# Patient Record
Sex: MALE | Race: WHITE | Employment: STUDENT | URBAN - METROPOLITAN AREA
[De-identification: names, ages, dates, MRNs, and addresses within clinical notes are randomized per-mention and may not be internally consistent; named-entity substitution may affect disease eponyms.]

---

## 2020-08-03 ENCOUNTER — TELEPHONE (OUTPATIENT)
Dept: ENDOCRINOLOGY | Age: 50
End: 2020-08-03

## 2020-08-03 ENCOUNTER — OFFICE VISIT (OUTPATIENT)
Dept: ENDOCRINOLOGY | Age: 50
End: 2020-08-03
Payer: COMMERCIAL

## 2020-08-03 VITALS
TEMPERATURE: 97.9 F | RESPIRATION RATE: 16 BRPM | BODY MASS INDEX: 17.63 KG/M2 | SYSTOLIC BLOOD PRESSURE: 146 MMHG | HEART RATE: 66 BPM | WEIGHT: 133 LBS | DIASTOLIC BLOOD PRESSURE: 87 MMHG | HEIGHT: 73 IN

## 2020-08-03 PROCEDURE — 99204 OFFICE O/P NEW MOD 45 MIN: CPT | Performed by: INTERNAL MEDICINE

## 2020-08-03 RX ORDER — ATORVASTATIN CALCIUM 20 MG/1
TABLET, FILM COATED ORAL
COMMUNITY
Start: 2020-07-28

## 2020-08-03 SDOH — HEALTH STABILITY: MENTAL HEALTH: HOW OFTEN DO YOU HAVE A DRINK CONTAINING ALCOHOL?: NEVER

## 2020-08-03 NOTE — PROGRESS NOTES
SUBJECTIVE:  Blair Dale is a 51 y.o. male who is  Seen here for hypothyroidism. Pts mom is my patient who referred him to me     Patient was diagnosed with Hypothyroidism approximately at age 56 , he was tested for thyroid hormone abnormality as his mom had Thyroid cancer . Symptoms at presentation were Fatigue and weight loss and throat clearing   current complaints: fatigue  History of obstructive symptoms: difficulty swallowing No, changes in voice/hoarseness No.    History of radiation to patient's neck: NO  Recent iodine exposure: No  Family history includes hypothyroidism, thyroid cancer. Family history of thyroid cancer: Yes  Pt has been diagnosed with ITP but platelet count has never been low   He has been diagnosed with MCTD and had seen a rheumatologist in the past but no medication was required. He has hyperlipidemia and has been on statins since 2017   He has noted that his glucose levels have been creeping up on his fasting labs for the last few years . Most recent was 100 in June 2020   He denies any polyuria or polydipsia   He has been under weight all his life but in the last few months he has noticed aprox 5 lbs weight loss. Denies any skin discoloration. Normal echocardiogram in 2015       Past Medical History:   Diagnosis Date    History of ITP     Hyperlipidemia      There are no active problems to display for this patient.     Past Surgical History:   Procedure Laterality Date    KNEE SURGERY      OTHER SURGICAL HISTORY      hernia sugery     Family History   Problem Relation Age of Onset    Hashimoto Thyroiditis Mother     Cancer Mother     Cancer Maternal Grandmother     Cancer Paternal Grandmother      Social History     Socioeconomic History    Marital status:      Spouse name: None    Number of children: None    Years of education: None    Highest education level: None   Occupational History    None   Social Needs    Financial resource strain: None   [place] insecurity     Worry: None     Inability: None    Transportation needs     Medical: None     Non-medical: None   Tobacco Use    Smoking status: Never Smoker    Smokeless tobacco: Never Used   Substance and Sexual Activity    Alcohol use: Never     Frequency: Never    Drug use: Never    Sexual activity: None   Lifestyle    Physical activity     Days per week: None     Minutes per session: None    Stress: None   Relationships    Social connections     Talks on phone: None     Gets together: None     Attends Mandaen service: None     Active member of club or organization: None     Attends meetings of clubs or organizations: None     Relationship status: None    Intimate partner violence     Fear of current or ex partner: None     Emotionally abused: None     Physically abused: None     Forced sexual activity: None   Other Topics Concern    None   Social History Narrative    None     Current Outpatient Medications   Medication Sig Dispense Refill    atorvastatin (LIPITOR) 20 MG tablet TK 1 T PO QD      VITAMIN D PO Take by mouth      Omega-3 Fatty Acids (FISH OIL PO) Take by mouth      Multiple Vitamins-Minerals (MULTIVITAMIN ADULT PO) Take by mouth       No current facility-administered medications for this visit. No Known Allergies      Review of Systems:  I have reviewed the review of system questionnaire filled by the patient .   Patient was advised to contact PCP for non endocrine signs and symptoms       OBJECTIVE:   BP (!) 146/87   Pulse 66   Temp 97.9 °F (36.6 °C)   Resp 16   Ht 6' 1\" (1.854 m)   Wt 133 lb (60.3 kg)   BMI 17.55 kg/m²   Wt Readings from Last 3 Encounters:   08/03/20 133 lb (60.3 kg)       Physical Exam:  Constitutional: no acute distress, well appearing, well nourished  Psychiatric: oriented to person, place and time, judgement, insight and normal, recent and remote memory and intact and mood, affect are normal  Skin: skin and subcutaneous tissue is normal without mass, normal turgor  Head and Face: examination of head and face revealed no abnormalities  Eyes: no lid or conjunctival swelling, no erythema or discharge, pupils are normal, equal, round. Ears/Nose: external inspection of ears and nose revealed no abnormalities, hearing is grossly normal  Oropharynx/Mouth/Face: lips, tongue and gums are normal with no lesions, the voice quality was normal  Neck: neck is supple and symmetric, with midline trachea and no masses, thyroid is normal  Lymphatics: normal cervical lymph nodes, normal supraclavicular nodes  Pulmonary: no increased work of breathing or signs of respiratory distress, lungs are clear to auscultation  Cardiovascular: normal heart rate and rhythm, normal S1 and S2, no murmurs   Abdomen: abdomen is soft, non-tender with no masses  Musculoskeletal: normal gait and station. Neurological: normal coordination, normal general cortical function    Lab Review:  No results found for: TSH  No results found for: T4FREE     ASSESSMENT/PLAN:  1. Hypothyroidism due to Hashimoto's thyroiditis  Pt apparently had elevated thyroid Ab in the past and his Mom also has Hashimotos and thyroid cancer and is my patient . Last TSh in June 2020( TSH verbal was 4.58)   I will order TFTS now   We discussed that we can start a trial of low dose Lt4 to see if there is any symptomatic improvement   Informal thyroid uls was normal       - TSH without Reflex; Future  - Vitamin D 25 Hydroxy; Future  - Hemoglobin A1C; Future  - T4, Free; Future  - Thyroid Peroxidase Antibody; Future  - Anti-Thyroglobulin Antibody; Future  - Comprehensive Metabolic Panel; Future    ELEVATED glucose   Repeat fasting   Check Aic   Not on steroids or any supplements     2. Underweight  He has been underweight all his life   He thinks maybe a 4 lbs wt loss in the last few months   I will check a Am cortisol and Acth       - TSH without Reflex; Future  - Vitamin D 25 Hydroxy;  Future  - Hemoglobin A1C; Future  - T4, Free; Future  - Thyroid Peroxidase Antibody; Future  - Anti-Thyroglobulin Antibody; Future  - Comprehensive Metabolic Panel; Future    3. Mixed hyperlipidemia  Pt has been on statins since 2017   No myalgias     4. Elevated blood pressure reading  Today only   According to the patient he never had high BP   He will test at home and call me with results       Reviewed and/or ordered clinical lab results Yes  Reviewed and/or ordered radiology tests Yes   Reviewed and/or ordered other diagnostic tests Yes  Made a decision to obtain old records Yes  Reviewed and summarized old records Yes      Ruchi Arora was counseled regarding symptoms of current diagnosis, course and complications of disease if inadequately treated, side effects of medications, diagnosis, treatment options, and prognosis, risks, benefits, complications, and alternatives of treatment, labs, imaging and other studies and treatment targets and goals. He understands instructions and counseling. Total time spent 50 min  minutes , more than 50 % if this time was spent on face to face counseling. Return in about 6 months (around 2/3/2021). Please note that some or all of this report was generated using voice recognition software. Please notify me in case of any questions about the content of this document, as some errors in transcription may have occurred .

## 2020-08-10 DIAGNOSIS — E03.8 HYPOTHYROIDISM DUE TO HASHIMOTO'S THYROIDITIS: ICD-10-CM

## 2020-08-10 DIAGNOSIS — E06.3 HYPOTHYROIDISM DUE TO HASHIMOTO'S THYROIDITIS: ICD-10-CM

## 2020-08-10 DIAGNOSIS — R63.6 UNDERWEIGHT: ICD-10-CM

## 2020-08-10 DIAGNOSIS — E78.2 MIXED HYPERLIPIDEMIA: ICD-10-CM

## 2020-08-10 LAB
ANTI-THYROGLOB ABS: 11 IU/ML
THYROID PEROXIDASE (TPO) ABS: 7 IU/ML
VITAMIN D 25-HYDROXY: 77.3 NG/ML

## 2020-08-11 LAB
A/G RATIO: 2.2 (ref 1.1–2.2)
ALBUMIN SERPL-MCNC: 4.7 G/DL (ref 3.4–5)
ALP BLD-CCNC: 69 U/L (ref 40–129)
ALT SERPL-CCNC: 25 U/L (ref 10–40)
ANION GAP SERPL CALCULATED.3IONS-SCNC: 15 MMOL/L (ref 3–16)
AST SERPL-CCNC: 21 U/L (ref 15–37)
BILIRUB SERPL-MCNC: 0.8 MG/DL (ref 0–1)
BUN BLDV-MCNC: 19 MG/DL (ref 7–20)
CALCIUM SERPL-MCNC: 9.5 MG/DL (ref 8.3–10.6)
CHLORIDE BLD-SCNC: 104 MMOL/L (ref 99–110)
CO2: 25 MMOL/L (ref 21–32)
CREAT SERPL-MCNC: 1.1 MG/DL (ref 0.9–1.3)
ESTIMATED AVERAGE GLUCOSE: 114 MG/DL
GFR AFRICAN AMERICAN: >60
GFR NON-AFRICAN AMERICAN: >60
GLOBULIN: 2.1 G/DL
GLUCOSE BLD-MCNC: 88 MG/DL (ref 70–99)
HBA1C MFR BLD: 5.6 %
POTASSIUM SERPL-SCNC: 3.9 MMOL/L (ref 3.5–5.1)
SODIUM BLD-SCNC: 144 MMOL/L (ref 136–145)
T4 FREE: 1.2 NG/DL (ref 0.9–1.8)
TOTAL PROTEIN: 6.8 G/DL (ref 6.4–8.2)
TSH SERPL DL<=0.05 MIU/L-ACNC: 3.89 UIU/ML (ref 0.27–4.2)

## 2021-01-11 ENCOUNTER — VIRTUAL VISIT (OUTPATIENT)
Dept: ENDOCRINOLOGY | Age: 51
End: 2021-01-11
Payer: COMMERCIAL

## 2021-01-11 DIAGNOSIS — E78.2 MIXED HYPERLIPIDEMIA: ICD-10-CM

## 2021-01-11 DIAGNOSIS — E03.8 HYPOTHYROIDISM DUE TO HASHIMOTO'S THYROIDITIS: Primary | ICD-10-CM

## 2021-01-11 DIAGNOSIS — E06.3 HYPOTHYROIDISM DUE TO HASHIMOTO'S THYROIDITIS: Primary | ICD-10-CM

## 2021-01-11 DIAGNOSIS — R63.6 UNDERWEIGHT: ICD-10-CM

## 2021-01-11 PROCEDURE — G8427 DOCREV CUR MEDS BY ELIG CLIN: HCPCS | Performed by: INTERNAL MEDICINE

## 2021-01-11 PROCEDURE — 99213 OFFICE O/P EST LOW 20 MIN: CPT | Performed by: INTERNAL MEDICINE

## 2021-01-11 RX ORDER — PANTOPRAZOLE SODIUM 40 MG/1
TABLET, DELAYED RELEASE ORAL
COMMUNITY
Start: 2020-12-21 | End: 2022-07-18

## 2021-01-11 NOTE — PROGRESS NOTES
SUBJECTIVE:  Amalia Georges is a 53 y.o. male who is  Seen here for hypothyroidism. Pts mom is my patient who referred him to me     Patient was diagnosed with Hypothyroidism approximately at age 56 , he was tested for thyroid hormone abnormality as his mom had Thyroid cancer . Symptoms at presentation were Fatigue and weight loss and throat clearing   current complaints: fatigue  History of obstructive symptoms: difficulty swallowing No, changes in voice/hoarseness No.    History of radiation to patient's neck: NO  Recent iodine exposure: No  Family history includes hypothyroidism, thyroid cancer. Family history of thyroid cancer: Yes  Pt has been diagnosed with ITP but platelet count has never been low   He has been diagnosed with MCTD and had seen a rheumatologist in the past but no medication was required. He has hyperlipidemia and has been on statins since 2017   He has noted that his glucose levels have been creeping up on his fasting labs for the last few years . Most recent was 100 in June 2020   He denies any polyuria or polydipsia   He has been under weight all his life but in the last few months he has noticed aprox 5 lbs weight loss. Denies any skin discoloration. Normal echocardiogram in 2015     INTERIM   Patient denies any changes in his symptoms denies any fatigue, constipation, abnormal weight gain, cold intolerance. Past Medical History:   Diagnosis Date    History of ITP     Hyperlipidemia      There are no active problems to display for this patient.     Past Surgical History:   Procedure Laterality Date    KNEE SURGERY      OTHER SURGICAL HISTORY      hernia sugery     Family History   Problem Relation Age of Onset    Hashimoto Thyroiditis Mother     Cancer Mother     Cancer Maternal Grandmother     Cancer Paternal Grandmother      Social History     Socioeconomic History    Marital status:      Spouse name: None    Number of children: None    Years of education: None    Highest education level: None   Occupational History    None   Social Needs    Financial resource strain: None    Food insecurity     Worry: None     Inability: None    Transportation needs     Medical: None     Non-medical: None   Tobacco Use    Smoking status: Never Smoker    Smokeless tobacco: Never Used   Substance and Sexual Activity    Alcohol use: Never     Frequency: Never    Drug use: Never    Sexual activity: None   Lifestyle    Physical activity     Days per week: None     Minutes per session: None    Stress: None   Relationships    Social connections     Talks on phone: None     Gets together: None     Attends Protestant service: None     Active member of club or organization: None     Attends meetings of clubs or organizations: None     Relationship status: None    Intimate partner violence     Fear of current or ex partner: None     Emotionally abused: None     Physically abused: None     Forced sexual activity: None   Other Topics Concern    None   Social History Narrative    None     Current Outpatient Medications   Medication Sig Dispense Refill    pantoprazole (PROTONIX) 40 MG tablet TAKE 1 TABLET BY MOUTH EVERY DAY      atorvastatin (LIPITOR) 20 MG tablet TK 1 T PO QD      VITAMIN D PO Take by mouth      Omega-3 Fatty Acids (FISH OIL PO) Take by mouth      Multiple Vitamins-Minerals (MULTIVITAMIN ADULT PO) Take by mouth       No current facility-administered medications for this visit. No Known Allergies      OBJECTIVE:   There were no vitals taken for this visit.   Wt Readings from Last 3 Encounters:   08/03/20 133 lb (60.3 kg)         Constitutional: no acute distress, well appearing and well nourished  Psychiatric: oriented to person, place and time, judgement and insight and normal, recent and remote memory intact and mood and affect are normal  Skin: skin and subcutaneous tissue is normal without visible mass,   Head and Face: visual inspection  of head and face revealed no abnormalities  Eyes: visual inspection showed no lid or conjunctival swelling, erythema or discharge, pupils are normal, equal, round  Ears/Nose: external inspection of ears and nose revealed no abnormalities, hearing is grossly normal  Oropharynx/Mouth/Face: lips, tongue and gums appear  normal with no lesions, the voice quality was normal  Neck: neck appears symmetric, with no visible masses,   Pulmonary: no increased work of breathing or signs of respiratory distress,  Musculoskeletal: normal on inspection    Neurological: normal coordination and normal general cortical function      Lab Review:  Lab Results   Component Value Date    TSH 3.89 08/10/2020     Lab Results   Component Value Date    T4FREE 1.2 08/10/2020        ASSESSMENT/PLAN:  ----Abnormal thyroid function test  Pt apparently had elevated thyroid Ab in the past and his Mom also has Hashimotos and thyroid cancer and is my patient . Last TSh in June 2020( TSH verbal was 4.58)     Patient's thyroid function tests were within normal limits in August 2020 with a TSH of 3 and negative thyroid antibodies. On review of his labs from [place] [id number] in December 2017 he had thyroid antibodies done which were again negative  Patient will send me the results of blood work where he had seen positive antibodies.   Currently he does not have symptoms of fatigue constipation or cold intolerance so we decided to continue monitoring his thyroid function test.    We discussed that we can start a trial of low dose Lt4 to see if there is any symptomatic improvement   Informal thyroid uls was normal and no discrete thyroid nodules were identified in August 2020 patient does have family history of thyroid cancer so we will repeat the thyroid ultrasound in the office in 6 months    ELEVATED glucose noted on a prior blood work  Repeat fasting was 88 in August 2020  A1c was 5.6  Not on steroids or any supplements     Underweight  He has been underweight all his life

## 2021-01-15 DIAGNOSIS — R63.6 UNDERWEIGHT: ICD-10-CM

## 2021-01-15 DIAGNOSIS — E06.3 HYPOTHYROIDISM DUE TO HASHIMOTO'S THYROIDITIS: ICD-10-CM

## 2021-01-15 DIAGNOSIS — E03.8 HYPOTHYROIDISM DUE TO HASHIMOTO'S THYROIDITIS: ICD-10-CM

## 2021-01-15 LAB
CORTISOL - AM: 12.9 UG/DL (ref 4.3–22.4)
T3 TOTAL: 0.98 NG/ML (ref 0.8–2)
T4 FREE: 1.2 NG/DL (ref 0.9–1.8)
TSH SERPL DL<=0.05 MIU/L-ACNC: 4.03 UIU/ML (ref 0.27–4.2)

## 2021-01-19 DIAGNOSIS — E06.3 HYPOTHYROIDISM DUE TO HASHIMOTO'S THYROIDITIS: Primary | ICD-10-CM

## 2021-01-19 DIAGNOSIS — E03.8 HYPOTHYROIDISM DUE TO HASHIMOTO'S THYROIDITIS: Primary | ICD-10-CM

## 2021-01-20 LAB — ADRENOCORTICOTROPIC HORMONE: 13 PG/ML (ref 7–69)

## 2021-07-07 DIAGNOSIS — E03.8 HYPOTHYROIDISM DUE TO HASHIMOTO'S THYROIDITIS: ICD-10-CM

## 2021-07-07 DIAGNOSIS — E06.3 HYPOTHYROIDISM DUE TO HASHIMOTO'S THYROIDITIS: ICD-10-CM

## 2021-07-07 LAB
CORTISOL - AM: 17.6 UG/DL (ref 4.3–22.4)
T3 TOTAL: 1.11 NG/ML (ref 0.8–2)
T4 FREE: 1.2 NG/DL (ref 0.9–1.8)
TSH SERPL DL<=0.05 MIU/L-ACNC: 4.64 UIU/ML (ref 0.27–4.2)

## 2021-07-12 LAB — ADRENOCORTICOTROPIC HORMONE: 37 PG/ML (ref 7–69)

## 2021-07-13 ENCOUNTER — OFFICE VISIT (OUTPATIENT)
Dept: ENDOCRINOLOGY | Age: 51
End: 2021-07-13
Payer: COMMERCIAL

## 2021-07-13 VITALS
DIASTOLIC BLOOD PRESSURE: 83 MMHG | HEART RATE: 71 BPM | SYSTOLIC BLOOD PRESSURE: 113 MMHG | WEIGHT: 128.2 LBS | BODY MASS INDEX: 16.91 KG/M2

## 2021-07-13 DIAGNOSIS — E06.3 HYPOTHYROIDISM DUE TO HASHIMOTO'S THYROIDITIS: Primary | ICD-10-CM

## 2021-07-13 DIAGNOSIS — E03.8 HYPOTHYROIDISM DUE TO HASHIMOTO'S THYROIDITIS: Primary | ICD-10-CM

## 2021-07-13 DIAGNOSIS — E03.9 ACQUIRED HYPOTHYROIDISM: ICD-10-CM

## 2021-07-13 DIAGNOSIS — E03.8 SUBCLINICAL HYPOTHYROIDISM: ICD-10-CM

## 2021-07-13 PROCEDURE — 99213 OFFICE O/P EST LOW 20 MIN: CPT | Performed by: INTERNAL MEDICINE

## 2021-07-13 PROCEDURE — G8427 DOCREV CUR MEDS BY ELIG CLIN: HCPCS | Performed by: INTERNAL MEDICINE

## 2021-07-13 PROCEDURE — 1036F TOBACCO NON-USER: CPT | Performed by: INTERNAL MEDICINE

## 2021-07-13 PROCEDURE — G8419 CALC BMI OUT NRM PARAM NOF/U: HCPCS | Performed by: INTERNAL MEDICINE

## 2021-07-13 NOTE — PROGRESS NOTES
SUBJECTIVE:  Maryanna Runner is a 53 y.o. male who is seen  here for hypothyroidism. Pts mom is my patient who referred him to me     Patient was diagnosed with Hypothyroidism approximately at age 56 , he was tested for thyroid hormone abnormality as his mom had Thyroid cancer . Symptoms at presentation were Fatigue and weight loss and throat clearing   current complaints: fatigue  History of obstructive symptoms: difficulty swallowing No, changes in voice/hoarseness No.    History of radiation to patient's neck: NO  Recent iodine exposure: No  Family history includes hypothyroidism, thyroid cancer. Family history of thyroid cancer: Yes  Pt has been diagnosed with ITP but platelet count has never been low   He has been diagnosed with MCTD and had seen a rheumatologist in the past but no medication was required. He has hyperlipidemia and has been on statins since 2017   He has noted that his glucose levels have been creeping up on his fasting labs for the last few years . Most recent was 100 in June 2020   He denies any polyuria or polydipsia   He has been under weight all his life but in the last few months he has noticed aprox 5 lbs weight loss. Denies any skin discoloration. Normal echocardiogram in 2015     INTERIM   Patient denies any changes in his symptoms denies any fatigue, constipation, abnormal weight gain, cold intolerance.    He takes care of his grandson       Past Medical History:   Diagnosis Date    History of ITP     Hyperlipidemia      Patient Active Problem List    Diagnosis Date Noted    Hypothyroidism due to Hashimoto's thyroiditis 01/11/2021    Underweight 01/11/2021    Mixed hyperlipidemia 01/11/2021     Past Surgical History:   Procedure Laterality Date    KNEE SURGERY      OTHER SURGICAL HISTORY      hernia sugery     Family History   Problem Relation Age of Onset    Hashimoto Thyroiditis Mother     Cancer Mother     Cancer Maternal Grandmother     Cancer Paternal Grandmother Social History     Socioeconomic History    Marital status:      Spouse name: None    Number of children: None    Years of education: None    Highest education level: None   Occupational History    None   Tobacco Use    Smoking status: Never Smoker    Smokeless tobacco: Never Used   Vaping Use    Vaping Use: Never used   Substance and Sexual Activity    Alcohol use: Never    Drug use: Never    Sexual activity: None   Other Topics Concern    None   Social History Narrative    None     Social Determinants of Health     Financial Resource Strain:     Difficulty of Paying Living Expenses:    Food Insecurity:     Worried About Running Out of Food in the Last Year:     Ran Out of Food in the Last Year:    Transportation Needs:     Lack of Transportation (Medical):  Lack of Transportation (Non-Medical):    Physical Activity:     Days of Exercise per Week:     Minutes of Exercise per Session:    Stress:     Feeling of Stress :    Social Connections:     Frequency of Communication with Friends and Family:     Frequency of Social Gatherings with Friends and Family:     Attends Holiness Services:     Active Member of Clubs or Organizations:     Attends Club or Organization Meetings:     Marital Status:    Intimate Partner Violence:     Fear of Current or Ex-Partner:     Emotionally Abused:     Physically Abused:     Sexually Abused:      Current Outpatient Medications   Medication Sig Dispense Refill    atorvastatin (LIPITOR) 20 MG tablet TK 1 T PO QD      VITAMIN D PO Take by mouth      Omega-3 Fatty Acids (FISH OIL PO) Take by mouth      Multiple Vitamins-Minerals (MULTIVITAMIN ADULT PO) Take by mouth      pantoprazole (PROTONIX) 40 MG tablet TAKE 1 TABLET BY MOUTH EVERY DAY       No current facility-administered medications for this visit. No Known Allergies     Ros  I have reviewed the review of system questionnaire filled by the patient .   Patient was advised to contact PCP for non endocrine signs and symptoms       OBJECTIVE:   /83   Pulse 71   Wt 128 lb 3.2 oz (58.2 kg)   BMI 16.91 kg/m²   Wt Readings from Last 3 Encounters:   07/13/21 128 lb 3.2 oz (58.2 kg)   08/03/20 133 lb (60.3 kg)         Constitutional: no acute distress, well appearing, well nourished  Psychiatric: oriented to person, place and time, judgement, insight and normal, recent and remote memory and intact and mood, affect are normal  Skin: skin and subcutaneous tissue is normal without mass,   Head and Face: examination of head and face revealed no abnormalities  Eyes: no lid or conjunctival swelling, no erythema or discharge, pupils are normal,   Ears/Nose: external inspection of ears and nose revealed no abnormalities, hearing is grossly normal  Oropharynx/Mouth/Face: lips, tongue and gums are normal with no lesions, the voice quality was normal  Neck: neck is supple and symmetric, with midline trachea and no masses, thyroid is normal    Pulmonary: no increased work of breathing or signs of respiratory distress, lungs are clear to auscultation  Cardiovascular: normal heart rate and rhythm, normal S1 and S2,   Musculoskeletal: normal gait and station,   Neurological: normal coordination, normal general cortical function        Lab Review:  Lab Results   Component Value Date    TSH 4.64 07/07/2021    TSH 4.03 01/15/2021    TSH 3.89 08/10/2020     Lab Results   Component Value Date    T4FREE 1.2 07/07/2021        ASSESSMENT/PLAN:  ----Abnormal thyroid function test  --Thyroid antibodies have been negative  ---TSh in June 2020( TSH verbal was 4.58) >> TSH value of 4.64 in July 2021 the upper limit of normal is 4.20, free T4 was normal at 1.2 and total T3 is 1.11  --We will continue to monitor thyroid function test and will also check for HAMA antibodies  Patient's thyroid function tests were within normal limits in August 2020 with a TSH of 3 and negative thyroid antibodies.     On review of his labs from [facility] in December 2017 he had thyroid antibodies done which were negative. Currently he does not have symptoms of fatigue constipation or cold intolerance so we decided to continue monitoring his thyroid function test.    We discussed that we can start a trial of low dose Lt4 to see if there is any symptomatic improvement. Informal thyroid uls was normal and no discrete thyroid nodules were identified in August 2020 patient does have family history of thyroid cancer so we will repeat the thyroid ultrasound in the office in 6 months    ELEVATED glucose noted on a prior blood work  Repeat fasting was 88 in August 2020  A1c was 5.6  Not on steroids or any supplements     Underweight  He has been underweight all his life   He thinks maybe a 4 lbs wt loss in the last few months   I will check a Am cortisol and Acth      Mixed hyperlipidemia  Pt has been on statins since 2017   No myalgias           Reviewed and/or ordered clinical lab results Yes  Reviewed and/or ordered radiology tests Yes   Reviewed and/or ordered other diagnostic tests Yes  Made a decision to obtain old records Yes  Reviewed and summarized old records Yes      Sandrine Rodríguez was counseled regarding symptoms of current diagnosis, course and complications of disease if inadequately treated, side effects of medications, diagnosis, treatment options, and prognosis, risks, benefits, complications, and alternatives of treatment, labs, imaging and other studies and treatment targets and goals. He understands instructions and counseling. Return in about 6 months (around 1/13/2022). Please note that some or all of this report was generated using voice recognition software. Please notify me in case of any questions about the content of this document, as some errors in transcription may have occurred .

## 2022-06-29 DIAGNOSIS — E03.8 SUBCLINICAL HYPOTHYROIDISM: ICD-10-CM

## 2022-06-29 DIAGNOSIS — E03.9 ACQUIRED HYPOTHYROIDISM: ICD-10-CM

## 2022-06-29 DIAGNOSIS — E06.3 HYPOTHYROIDISM DUE TO HASHIMOTO'S THYROIDITIS: ICD-10-CM

## 2022-06-29 DIAGNOSIS — E03.8 HYPOTHYROIDISM DUE TO HASHIMOTO'S THYROIDITIS: ICD-10-CM

## 2022-06-29 LAB
T3 TOTAL: 1.04 NG/ML (ref 0.8–2)
T4 FREE: 1.1 NG/DL (ref 0.9–1.8)
TSH SERPL DL<=0.05 MIU/L-ACNC: 5.19 UIU/ML (ref 0.27–4.2)

## 2022-07-18 ENCOUNTER — OFFICE VISIT (OUTPATIENT)
Dept: ENDOCRINOLOGY | Age: 52
End: 2022-07-18
Payer: COMMERCIAL

## 2022-07-18 VITALS
RESPIRATION RATE: 16 BRPM | DIASTOLIC BLOOD PRESSURE: 81 MMHG | SYSTOLIC BLOOD PRESSURE: 126 MMHG | HEIGHT: 72 IN | WEIGHT: 129 LBS | HEART RATE: 57 BPM | BODY MASS INDEX: 17.47 KG/M2

## 2022-07-18 DIAGNOSIS — E06.3 HYPOTHYROIDISM DUE TO HASHIMOTO'S THYROIDITIS: Primary | ICD-10-CM

## 2022-07-18 DIAGNOSIS — R73.03 PREDIABETES: ICD-10-CM

## 2022-07-18 DIAGNOSIS — R03.0 ELEVATED BLOOD PRESSURE READING: ICD-10-CM

## 2022-07-18 DIAGNOSIS — R63.6 UNDERWEIGHT: ICD-10-CM

## 2022-07-18 DIAGNOSIS — E03.8 HYPOTHYROIDISM DUE TO HASHIMOTO'S THYROIDITIS: Primary | ICD-10-CM

## 2022-07-18 DIAGNOSIS — E03.9 ACQUIRED HYPOTHYROIDISM: ICD-10-CM

## 2022-07-18 PROCEDURE — 1036F TOBACCO NON-USER: CPT | Performed by: INTERNAL MEDICINE

## 2022-07-18 PROCEDURE — G8427 DOCREV CUR MEDS BY ELIG CLIN: HCPCS | Performed by: INTERNAL MEDICINE

## 2022-07-18 PROCEDURE — G8419 CALC BMI OUT NRM PARAM NOF/U: HCPCS | Performed by: INTERNAL MEDICINE

## 2022-07-18 PROCEDURE — 3017F COLORECTAL CA SCREEN DOC REV: CPT | Performed by: INTERNAL MEDICINE

## 2022-07-18 PROCEDURE — 99214 OFFICE O/P EST MOD 30 MIN: CPT | Performed by: INTERNAL MEDICINE

## 2022-07-18 NOTE — PROGRESS NOTES
SUBJECTIVE:  Tres Butler is a 49 y.o. male who is seen  here for hypothyroidism. Pts mom is my patient who referred him to me     Patient was diagnosed with Hypothyroidism approximately at age 56 , he was tested for thyroid hormone abnormality as his mom had Thyroid cancer . Symptoms at presentation were Fatigue and weight loss and throat clearing   current complaints: fatigue  History of obstructive symptoms: difficulty swallowing No, changes in voice/hoarseness No.    History of radiation to patient's neck: NO  Recent iodine exposure: No  Family history includes hypothyroidism, thyroid cancer. Family history of thyroid cancer: Yes  Pt has been diagnosed with ITP but platelet count has never been low   He has been diagnosed with MCTD and had seen a rheumatologist in the past but no medication was required. He has hyperlipidemia and has been on statins since 2017   He has noted that his glucose levels have been creeping up on his fasting labs for the last few years . Most recent was 100 in June 2020   He denies any polyuria or polydipsia   He has been under weight all his life but in the last few months he has noticed aprox 5 lbs weight loss. Denies any skin discoloration. Normal echocardiogram in 2015     INTERIM   Patient denies any changes in his symptoms denies any fatigue, constipation, abnormal weight gain, cold intolerance.          Past Medical History:   Diagnosis Date    History of ITP     Hyperlipidemia      Patient Active Problem List    Diagnosis Date Noted    Hypothyroidism due to Hashimoto's thyroiditis 01/11/2021    Underweight 01/11/2021    Mixed hyperlipidemia 01/11/2021     Past Surgical History:   Procedure Laterality Date    KNEE SURGERY      OTHER SURGICAL HISTORY      hernia sugery     Family History   Problem Relation Age of Onset    Hashimoto Thyroiditis Mother     Cancer Mother     Cancer Maternal Grandmother     Cancer Paternal Grandmother      Social History     Socioeconomic History    Marital status:      Spouse name: None    Number of children: None    Years of education: None    Highest education level: None   Tobacco Use    Smoking status: Never    Smokeless tobacco: Never   Vaping Use    Vaping Use: Never used   Substance and Sexual Activity    Alcohol use: Never    Drug use: Never     Current Outpatient Medications   Medication Sig Dispense Refill    atorvastatin (LIPITOR) 20 MG tablet TK 1 T PO QD      VITAMIN D PO Take by mouth      Omega-3 Fatty Acids (FISH OIL PO) Take by mouth      Multiple Vitamins-Minerals (MULTIVITAMIN ADULT PO) Take by mouth       No current facility-administered medications for this visit. No Known Allergies     Ros  I have reviewed the review of system questionnaire filled by the patient .   Patient was advised to contact PCP for non endocrine signs and symptoms       OBJECTIVE:   /81   Pulse 57   Resp 16   Ht 6' (1.829 m)   Wt 129 lb (58.5 kg)   BMI 17.50 kg/m²   Wt Readings from Last 3 Encounters:   07/18/22 129 lb (58.5 kg)   07/13/21 128 lb 3.2 oz (58.2 kg)   08/03/20 133 lb (60.3 kg)         Constitutional: no acute distress, well appearing, well nourished  Psychiatric: oriented to person, place and time, judgement, insight and normal, recent and remote memory and intact and mood, affect are normal  Skin: skin and subcutaneous tissue is normal without mass,   Head and Face: examination of head and face revealed no abnormalities  Eyes: no lid or conjunctival swelling, no erythema or discharge, pupils are normal,   Ears/Nose: external inspection of ears and nose revealed no abnormalities, hearing is grossly normal  Oropharynx/Mouth/Face: lips, tongue and gums are normal with no lesions, the voice quality was normal  Neck: neck is supple and symmetric, with midline trachea and no masses, thyroid is normal    Pulmonary: no increased work of breathing or signs of respiratory distress, lungs are clear to auscultation  Cardiovascular: normal heart rate and rhythm, normal S1 and S2,   Musculoskeletal: normal gait and station,   Neurological: normal coordination, normal general cortical function        Lab Review:  Lab Results   Component Value Date/Time    TSH 5.19 06/29/2022 07:35 AM    TSH 5.39 10/12/2021 10:01 AM    TSH 4.64 07/07/2021 08:06 AM     Lab Results   Component Value Date/Time    T4FREE 1.1 06/29/2022 07:35 AM        ASSESSMENT/PLAN:  ----Abnormal thyroid function test  --Thyroid antibodies have been negative--HAMA negative   ---TSh in June 2020 (TSH verbal was 4.58) >> TSH value of 4.64 in July 2021 the upper limit of normal is 4.20, free T4 was normal at 1.2 and total T3 is 1.11    On review of his labs from [place] [address] in December 2017 he had thyroid antibodies done which were negative. Currently he does not have symptoms of fatigue constipation or cold intolerance so we decided to continue monitoring his thyroid function test.    We discussed that we can start a trial of low dose Lt4 to see if there is any symptomatic improvement. Informal thyroid uls was normal and no discrete thyroid nodules were identified in August 2020 patient does have family history of thyroid cancer so we will repeat the thyroid ultrasound in the office in 6 months    ELEVATED glucose noted on a prior blood work  A1c creeped up but patient necessarily does not watch any diet as he is trying to gain weight so he was advised to work on carbohydrate restriction.   Will check for ayden antibodies and C-peptide to look for autoimmune processes not on steroids or any supplements   Consuming 3000 calories per day       Underweight  He has been underweight all his life        Mixed hyperlipidemia  Pt has been on statins since 2017   No myalgias   Duiscussed link with elevated glucose           Reviewed and/or ordered clinical lab results Yes  Reviewed and/or ordered radiology tests Yes   Reviewed and/or ordered other diagnostic tests Yes  Made a decision to obtain old records Yes  Reviewed and summarized old records Yes      Pan Sheikh was counseled regarding symptoms of current diagnosis, course and complications of disease if inadequately treated, side effects of medications, diagnosis, treatment options, and prognosis, risks, benefits, complications, and alternatives of treatment, labs, imaging and other studies and treatment targets and goals. He understands instructions and counseling. I spent  30 + minutes which includes reviewing patient chart , interpreting previous lab results  , discussing and providing counseling and coordinating care of patient's multiple health issues with  the patient. Return in about 6 months (around 1/18/2023). Please note that some or all of this report was generated using voice recognition software. Please notify me in case of any questions about the content of this document, as some errors in transcription may have occurred .

## 2022-08-22 ENCOUNTER — TELEPHONE (OUTPATIENT)
Dept: ENDOCRINOLOGY | Age: 52
End: 2022-08-22

## 2022-08-22 NOTE — TELEPHONE ENCOUNTER
Fax from [place] with Lab Results from 6/16/22 and 12/21/21. It looks like we have already received the 6/16/22 results before. Not sure if you needed these or not.

## 2023-01-06 DIAGNOSIS — R73.03 PREDIABETES: ICD-10-CM

## 2023-01-06 DIAGNOSIS — R63.6 UNDERWEIGHT: ICD-10-CM

## 2023-01-06 DIAGNOSIS — E03.9 ACQUIRED HYPOTHYROIDISM: ICD-10-CM

## 2023-01-06 LAB
A/G RATIO: 1.8 (ref 1.1–2.2)
ALBUMIN SERPL-MCNC: 4.3 G/DL (ref 3.4–5)
ALP BLD-CCNC: 74 U/L (ref 40–129)
ALT SERPL-CCNC: 21 U/L (ref 10–40)
ANION GAP SERPL CALCULATED.3IONS-SCNC: 9 MMOL/L (ref 3–16)
AST SERPL-CCNC: 24 U/L (ref 15–37)
BILIRUB SERPL-MCNC: 0.5 MG/DL (ref 0–1)
BUN BLDV-MCNC: 27 MG/DL (ref 7–20)
CALCIUM SERPL-MCNC: 9.5 MG/DL (ref 8.3–10.6)
CHLORIDE BLD-SCNC: 103 MMOL/L (ref 99–110)
CO2: 28 MMOL/L (ref 21–32)
CORTISOL TOTAL: 14.9 UG/DL
CREAT SERPL-MCNC: 1 MG/DL (ref 0.9–1.3)
FOLLICLE STIMULATING HORMONE: 9.4 MIU/ML
GFR SERPL CREATININE-BSD FRML MDRD: >60 ML/MIN/{1.73_M2}
GLUCOSE BLD-MCNC: 89 MG/DL (ref 70–99)
LUTEINIZING HORMONE: 4.7 MIU/ML
POTASSIUM SERPL-SCNC: 4.2 MMOL/L (ref 3.5–5.1)
SODIUM BLD-SCNC: 140 MMOL/L (ref 136–145)
T3 TOTAL: 0.88 NG/ML (ref 0.8–2)
T4 FREE: 1 NG/DL (ref 0.9–1.8)
THYROID PEROXIDASE (TPO) ABS: <5 IU/ML
TOTAL PROTEIN: 6.7 G/DL (ref 6.4–8.2)
TSH SERPL DL<=0.05 MIU/L-ACNC: 4.38 UIU/ML (ref 0.27–4.2)

## 2023-01-07 LAB
ESTIMATED AVERAGE GLUCOSE: 105.4 MG/DL
HBA1C MFR BLD: 5.3 %

## 2023-01-10 LAB
ADRENOCORTICOTROPIC HORMONE: 14 PG/ML (ref 7–69)
C-PEPTIDE: 1 NG/ML (ref 1.1–4.4)
SEX HORMONE BINDING GLOBULIN: 65 NMOL/L (ref 11–80)
TESTOSTERONE FREE-NONMALE: 59.9 PG/ML (ref 47–244)
TESTOSTERONE TOTAL: 463 NG/DL (ref 220–1000)

## 2023-01-11 LAB — GLUTAMIC ACID DECARB AB: <5 IU/ML (ref 0–5)

## 2023-01-16 ENCOUNTER — OFFICE VISIT (OUTPATIENT)
Dept: ENDOCRINOLOGY | Age: 53
End: 2023-01-16
Payer: COMMERCIAL

## 2023-01-16 VITALS
RESPIRATION RATE: 16 BRPM | BODY MASS INDEX: 17.61 KG/M2 | HEART RATE: 58 BPM | HEIGHT: 72 IN | SYSTOLIC BLOOD PRESSURE: 125 MMHG | DIASTOLIC BLOOD PRESSURE: 76 MMHG | WEIGHT: 130 LBS

## 2023-01-16 DIAGNOSIS — R63.6 UNDERWEIGHT: ICD-10-CM

## 2023-01-16 DIAGNOSIS — E03.9 ACQUIRED HYPOTHYROIDISM: Primary | ICD-10-CM

## 2023-01-16 DIAGNOSIS — R73.03 PREDIABETES: ICD-10-CM

## 2023-01-16 DIAGNOSIS — E78.2 MIXED HYPERLIPIDEMIA: ICD-10-CM

## 2023-01-16 PROCEDURE — 1036F TOBACCO NON-USER: CPT | Performed by: INTERNAL MEDICINE

## 2023-01-16 PROCEDURE — G8419 CALC BMI OUT NRM PARAM NOF/U: HCPCS | Performed by: INTERNAL MEDICINE

## 2023-01-16 PROCEDURE — 99214 OFFICE O/P EST MOD 30 MIN: CPT | Performed by: INTERNAL MEDICINE

## 2023-01-16 PROCEDURE — 3017F COLORECTAL CA SCREEN DOC REV: CPT | Performed by: INTERNAL MEDICINE

## 2023-01-16 PROCEDURE — G8427 DOCREV CUR MEDS BY ELIG CLIN: HCPCS | Performed by: INTERNAL MEDICINE

## 2023-01-16 PROCEDURE — G8484 FLU IMMUNIZE NO ADMIN: HCPCS | Performed by: INTERNAL MEDICINE

## 2023-01-16 NOTE — PROGRESS NOTES
SUBJECTIVE:  Pj Isabel is a 47 y.o. male who is seen  here for subclinical hypothyroidism and abnormalities of glucose metabolism with a low C-peptide of 1 in January 2023. Pts mom is my patient who referred him to me, mom has thyroid cancer     Patient was diagnosed with Hypothyroidism approximately at age 56 , he was tested for thyroid hormone abnormality as his mom had Thyroid cancer . Symptoms at presentation were Fatigue and weight loss and throat clearing   current complaints: fatigue  History of obstructive symptoms: difficulty swallowing No, changes in voice/hoarseness No.    History of radiation to patient's neck: NO  Recent iodine exposure: No  Family history includes hypothyroidism, thyroid cancer. Family history of thyroid cancer: Yes  Pt has been diagnosed with ITP but platelet count has never been low   He has been diagnosed with MCTD and had seen a rheumatologist in the past but no medication was required. He has hyperlipidemia and has been on statins since 2017   He has noted that his glucose levels have been creeping up on his fasting labs for the last few years . Most recent was 100 in June 2020   He denies any polyuria or polydipsia   He has been under weight all his life but in the last few months he has noticed aprox 5 lbs weight loss. Denies any skin discoloration. Normal echocardiogram in 2015     INTERIM   Patient denies any changes in his symptoms denies any fatigue, constipation, abnormal weight gain, cold intolerance.          Past Medical History:   Diagnosis Date    History of ITP     Hyperlipidemia      Patient Active Problem List    Diagnosis Date Noted    Hypothyroidism due to Hashimoto's thyroiditis 01/11/2021    Underweight 01/11/2021    Mixed hyperlipidemia 01/11/2021     Past Surgical History:   Procedure Laterality Date    KNEE SURGERY      OTHER SURGICAL HISTORY      hernia sugery     Family History   Problem Relation Age of Onset    Hashimoto Thyroiditis Mother Cancer Mother     Cancer Maternal Grandmother     Cancer Paternal Grandmother      Social History     Socioeconomic History    Marital status:      Spouse name: None    Number of children: None    Years of education: None    Highest education level: None   Tobacco Use    Smoking status: Never    Smokeless tobacco: Never   Vaping Use    Vaping Use: Never used   Substance and Sexual Activity    Alcohol use: Never    Drug use: Never     Current Outpatient Medications   Medication Sig Dispense Refill    atorvastatin (LIPITOR) 20 MG tablet TK 1 T PO QD      VITAMIN D PO Take by mouth      Omega-3 Fatty Acids (FISH OIL PO) Take by mouth      Multiple Vitamins-Minerals (MULTIVITAMIN ADULT PO) Take by mouth       No current facility-administered medications for this visit. No Known Allergies     Ros  I have reviewed the review of system questionnaire filled by the patient .   Patient was advised to contact PCP for non endocrine signs and symptoms       OBJECTIVE:   /76   Pulse 58   Resp 16   Ht 6' (1.829 m)   Wt 130 lb (59 kg)   BMI 17.63 kg/m²   Wt Readings from Last 3 Encounters:   01/16/23 130 lb (59 kg)   07/18/22 129 lb (58.5 kg)   07/13/21 128 lb 3.2 oz (58.2 kg)         Constitutional: no acute distress, well appearing, well nourished  Psychiatric: oriented to person, place and time, judgement, insight and normal, recent and remote memory and intact and mood, affect are normal  Skin: skin and subcutaneous tissue is normal without mass,   Head and Face: examination of head and face revealed no abnormalities  Eyes: no lid or conjunctival swelling, no erythema or discharge, pupils are normal,   Ears/Nose: external inspection of ears and nose revealed no abnormalities, hearing is grossly normal  Oropharynx/Mouth/Face: lips, tongue and gums are normal with no lesions, the voice quality was normal  Neck: neck is supple and symmetric, with midline trachea and no masses, thyroid is normal    Pulmonary: no increased work of breathing or signs of respiratory distress, lungs are clear to auscultation  Cardiovascular: normal heart rate and rhythm, normal S1 and S2,   Musculoskeletal: normal gait and station,   Neurological: normal coordination, normal general cortical function        Lab Review:  Lab Results   Component Value Date/Time    TSH 4.38 01/06/2023 08:04 AM    TSH 5.19 06/29/2022 07:35 AM    TSH 5.39 10/12/2021 10:01 AM     Lab Results   Component Value Date/Time    T4FREE 1.0 01/06/2023 08:04 AM        ASSESSMENT/PLAN:    ----Abnormal thyroid function test  --Thyroid antibodies have been negative--HAMA negative   ---TSh in June 2020 (TSH verbal was 4.58) >> TSH value of 4.64 in July 2021 the upper limit of normal is 4.20, free T4 was normal at 1.2 and total T3 is 1.11  TSH value of 4.38 in January 2023 with a low normal T3 and a normal free T4 of 1.0. On review of his labs from [place] [address] in December 2017 he had thyroid antibodies done which were negative. Currently he does not have symptoms of fatigue constipation or cold intolerance so we decided to continue monitoring his thyroid function test.    We discussed that we can start a trial of low dose Lt4 to see if there is any symptomatic improvement. Informal thyroid uls was normal and no discrete thyroid nodules were identified in August 2020 patient does have family history of thyroid cancer so we will repeat the thyroid ultrasound in the office in 6 months    ELEVATED glucose noted on a prior blood work  A1c creeped up but patient necessarily does not watch any diet as he is trying to gain weight so he was advised to work on carbohydrate restriction.   Will check for ayden antibodies and C-peptide to look for autoimmune processes not on steroids or any supplements   Consuming 3000 calories per day   Reviewed labs in January 2023 with a fasting glucose of 89, and an A1c of 5.3, glutamic acid decarboxylase antibodies were negative and a C-peptide value of 1.0 where the lower limit of normal was 1.1. We had a lengthy discussion about keeping an eye on his glucose values as this process progresses. Underweight  He has been underweight all his life   Eats heathy. I did test her morning cortisol in January 2023 with a normal value of 14.9, with a normal ACTH. Testosterone was 463 in January 2023. [address] LH were normal       Mixed hyperlipidemia  Pt has been on statins since 2017   No myalgias   Duiscussed link with elevated glucose           Reviewed and/or ordered clinical lab results Yes  Reviewed and/or ordered radiology tests Yes   Reviewed and/or ordered other diagnostic tests Yes  Made a decision to obtain old records Yes  Reviewed and summarized old records Yes      Charlesetta Rubinstein was counseled regarding symptoms of current diagnosis, course and complications of disease if inadequately treated, side effects of medications, diagnosis, treatment options, and prognosis, risks, benefits, complications, and alternatives of treatment, labs, imaging and other studies and treatment targets and goals. He understands instructions and counseling. I spent  30 + minutes which includes reviewing patient chart , interpreting previous lab results  , discussing and providing counseling and coordinating care of patient's multiple health issues with  the patient. Return in about 1 year (around 1/16/2024). Please note that some or all of this report was generated using voice recognition software. Please notify me in case of any questions about the content of this document, as some errors in transcription may have occurred .

## 2024-01-09 DIAGNOSIS — E03.9 ACQUIRED HYPOTHYROIDISM: ICD-10-CM

## 2024-01-09 DIAGNOSIS — R63.6 UNDERWEIGHT: ICD-10-CM

## 2024-01-09 DIAGNOSIS — R73.03 PREDIABETES: ICD-10-CM

## 2024-01-09 LAB
ALBUMIN SERPL-MCNC: 4.5 G/DL (ref 3.4–5)
ALBUMIN/GLOB SERPL: 2.1 {RATIO} (ref 1.1–2.2)
ALP SERPL-CCNC: 70 U/L (ref 40–129)
ALT SERPL-CCNC: 23 U/L (ref 10–40)
ANION GAP SERPL CALCULATED.3IONS-SCNC: 8 MMOL/L (ref 3–16)
AST SERPL-CCNC: 26 U/L (ref 15–37)
BILIRUB SERPL-MCNC: 0.7 MG/DL (ref 0–1)
BUN SERPL-MCNC: 19 MG/DL (ref 7–20)
CALCIUM SERPL-MCNC: 9.2 MG/DL (ref 8.3–10.6)
CHLORIDE SERPL-SCNC: 102 MMOL/L (ref 99–110)
CHOLEST SERPL-MCNC: 155 MG/DL (ref 0–199)
CO2 SERPL-SCNC: 29 MMOL/L (ref 21–32)
CREAT SERPL-MCNC: 1 MG/DL (ref 0.9–1.3)
GFR SERPLBLD CREATININE-BSD FMLA CKD-EPI: >60 ML/MIN/{1.73_M2}
GLUCOSE SERPL-MCNC: 84 MG/DL (ref 70–99)
HDLC SERPL-MCNC: 59 MG/DL (ref 40–60)
LDLC SERPL CALC-MCNC: 85 MG/DL
POTASSIUM SERPL-SCNC: 4.1 MMOL/L (ref 3.5–5.1)
PROT SERPL-MCNC: 6.6 G/DL (ref 6.4–8.2)
SODIUM SERPL-SCNC: 139 MMOL/L (ref 136–145)
T3 SERPL-MCNC: 1.12 NG/ML (ref 0.8–2)
T4 FREE SERPL-MCNC: 1.2 NG/DL (ref 0.9–1.8)
TRIGL SERPL-MCNC: 53 MG/DL (ref 0–150)
TSH SERPL DL<=0.005 MIU/L-ACNC: 5.71 UIU/ML (ref 0.27–4.2)
VLDLC SERPL CALC-MCNC: 11 MG/DL

## 2024-01-10 LAB
EST. AVERAGE GLUCOSE BLD GHB EST-MCNC: 105.4 MG/DL
HBA1C MFR BLD: 5.3 %

## 2024-01-11 LAB — C PEPTIDE SERPL-MCNC: 1 NG/ML (ref 1.1–4.4)

## 2024-01-15 ENCOUNTER — OFFICE VISIT (OUTPATIENT)
Dept: ENDOCRINOLOGY | Age: 54
End: 2024-01-15
Payer: COMMERCIAL

## 2024-01-15 VITALS
BODY MASS INDEX: 17.64 KG/M2 | HEART RATE: 66 BPM | HEIGHT: 72 IN | SYSTOLIC BLOOD PRESSURE: 127 MMHG | RESPIRATION RATE: 16 BRPM | DIASTOLIC BLOOD PRESSURE: 82 MMHG | WEIGHT: 130.2 LBS

## 2024-01-15 DIAGNOSIS — E03.8 HYPOTHYROIDISM DUE TO HASHIMOTO'S THYROIDITIS: Primary | ICD-10-CM

## 2024-01-15 DIAGNOSIS — E06.3 HYPOTHYROIDISM DUE TO HASHIMOTO'S THYROIDITIS: Primary | ICD-10-CM

## 2024-01-15 DIAGNOSIS — R63.6 UNDERWEIGHT: ICD-10-CM

## 2024-01-15 DIAGNOSIS — E78.2 MIXED HYPERLIPIDEMIA: ICD-10-CM

## 2024-01-15 DIAGNOSIS — Z80.8 FAMILY HISTORY OF THYROID CANCER: ICD-10-CM

## 2024-01-15 DIAGNOSIS — R73.09 ABNORMAL GLUCOSE LEVEL: ICD-10-CM

## 2024-01-15 PROCEDURE — G8484 FLU IMMUNIZE NO ADMIN: HCPCS | Performed by: INTERNAL MEDICINE

## 2024-01-15 PROCEDURE — 1036F TOBACCO NON-USER: CPT | Performed by: INTERNAL MEDICINE

## 2024-01-15 PROCEDURE — G8427 DOCREV CUR MEDS BY ELIG CLIN: HCPCS | Performed by: INTERNAL MEDICINE

## 2024-01-15 PROCEDURE — 99214 OFFICE O/P EST MOD 30 MIN: CPT | Performed by: INTERNAL MEDICINE

## 2024-01-15 PROCEDURE — G8419 CALC BMI OUT NRM PARAM NOF/U: HCPCS | Performed by: INTERNAL MEDICINE

## 2024-01-15 PROCEDURE — 3017F COLORECTAL CA SCREEN DOC REV: CPT | Performed by: INTERNAL MEDICINE

## 2024-01-15 RX ORDER — FLUOCINONIDE 0.5 MG/G
OINTMENT TOPICAL
COMMUNITY
Start: 2024-01-11

## 2024-01-15 NOTE — PROGRESS NOTES
work of breathing or signs of respiratory distress, lungs are clear to auscultation  Cardiovascular: normal heart rate and rhythm, normal S1 and S2,   Musculoskeletal: normal gait and station,   Neurological: normal coordination, normal general cortical function        Lab Review:  Lab Results   Component Value Date/Time    TSH 5.71 01/09/2024 07:35 AM    TSH 4.38 01/06/2023 08:04 AM    TSH 5.19 06/29/2022 07:35 AM     Lab Results   Component Value Date/Time    T4FREE 1.2 01/09/2024 07:35 AM        ASSESSMENT/PLAN:    ----Subclinical hypothyroidism    --Thyroid antibodies have been negative they were repeated in January 2023 and TPO was negative--HAMA negative --although he does have family history of Hashimoto's as well as thyroid cancer  ---TSh in June 2020 (TSH verbal was 4.58) >> TSH value of 4.64 in July 2021 the upper limit of normal is 4.20, free T4 was normal at 1.2 and total T3 is 1.11  TSH value of 4.38 in January 2023 with a low normal T3 and a normal free T4 of 1.0.  TSH slightly elevated but the free thyroid hormone levels are normal in January 2024 we will continue to monitor it yearly  Currently he does not have symptoms of fatigue constipation or cold intolerance so we decided to continue monitoring his thyroid function test.  Will continue to monitor on yearly basis    Family history of papillary thyroid carcinoma    Informal thyroid uls was normal and no discrete thyroid nodules were identified in August 2020 .  Gave patient orders to get a thyroid ultrasound done as a baseline now    ELEVATED glucose noted on a prior blood work  A1c creeped up but patient necessarily does not watch any diet as he is trying to gain weight so he was advised to work on carbohydrate restriction.  Will check for ayden antibodies and C-peptide to look for autoimmune processes not on steroids or any supplements   Consuming 3000 calories per day   Reviewed labs in January 2023 with a fasting glucose of 89, and an A1c of

## 2024-02-13 ENCOUNTER — HOSPITAL ENCOUNTER (OUTPATIENT)
Dept: ULTRASOUND IMAGING | Age: 54
Discharge: HOME | End: 2024-02-13
Payer: COMMERCIAL

## 2024-02-13 DIAGNOSIS — E78.2 MIXED HYPERLIPIDEMIA: ICD-10-CM

## 2024-02-13 DIAGNOSIS — R63.6 UNDERWEIGHT: ICD-10-CM

## 2024-02-13 DIAGNOSIS — E06.3 HYPOTHYROIDISM DUE TO HASHIMOTO'S THYROIDITIS: ICD-10-CM

## 2024-02-13 DIAGNOSIS — E03.8 HYPOTHYROIDISM DUE TO HASHIMOTO'S THYROIDITIS: ICD-10-CM

## 2024-02-13 LAB
ALBUMIN SERPL-MCNC: 4.4 G/DL (ref 3.4–5)
ALBUMIN/GLOB SERPL: 2.2 {RATIO} (ref 1.1–2.2)
ALP SERPL-CCNC: 59 U/L (ref 40–129)
ALT SERPL-CCNC: 23 U/L (ref 10–40)
ANION GAP SERPL CALCULATED.3IONS-SCNC: 11 MMOL/L (ref 3–16)
AST SERPL-CCNC: 25 U/L (ref 15–37)
BILIRUB SERPL-MCNC: 0.5 MG/DL (ref 0–1)
BUN SERPL-MCNC: 20 MG/DL (ref 7–20)
CALCIUM SERPL-MCNC: 9.1 MG/DL (ref 8.3–10.6)
CHLORIDE SERPL-SCNC: 103 MMOL/L (ref 99–110)
CO2 SERPL-SCNC: 27 MMOL/L (ref 21–32)
CREAT SERPL-MCNC: 1 MG/DL (ref 0.9–1.3)
GFR SERPLBLD CREATININE-BSD FMLA CKD-EPI: >60 ML/MIN/{1.73_M2}
GLUCOSE SERPL-MCNC: 91 MG/DL (ref 70–99)
POTASSIUM SERPL-SCNC: 4.1 MMOL/L (ref 3.5–5.1)
PROT SERPL-MCNC: 6.4 G/DL (ref 6.4–8.2)
SODIUM SERPL-SCNC: 141 MMOL/L (ref 136–145)

## 2024-02-13 PROCEDURE — 76536 US EXAM OF HEAD AND NECK: CPT

## 2024-02-15 LAB
C PEPTIDE SERPL-MCNC: 1.1 NG/ML (ref 1.1–4.4)
DHEA-S SERPL-MCNC: 166 UG/DL (ref 44.3–331)
GAD65 AB SER IA-ACNC: <5 IU/ML (ref 0–5)

## 2024-02-16 LAB — ACTH PLAS-MCNC: 30 PG/ML (ref 7–63)

## 2024-02-17 LAB — ZNT8 AB SERPL IA-ACNC: <10 U/ML (ref 0–15)

## 2024-06-17 ENCOUNTER — TELEPHONE (OUTPATIENT)
Dept: ENDOCRINOLOGY | Age: 54
End: 2024-06-17

## 2024-06-17 NOTE — TELEPHONE ENCOUNTER
----- Message from Biju Ann sent at 6/17/2024  3:14 PM EDT -----  Regarding: Primary Care bloodwork  Contact: [phone]  Hi Dr. Rapp, as we talked this winter I am sharing with you select bloodwork from my semi-annual visit to my primary physician.  TSH = 6.70, T4 = 1.18, A1C = 5.8, glucose = 86. This system only allows two attachments, please let me know if I should share any additional detail separately. Aside from a really rough case of the flu I brought home from a trip last week, all is well. Thank you, Biju Ann.

## 2025-01-03 DIAGNOSIS — E06.3 HYPOTHYROIDISM DUE TO HASHIMOTO'S THYROIDITIS: ICD-10-CM

## 2025-01-03 DIAGNOSIS — E78.2 MIXED HYPERLIPIDEMIA: ICD-10-CM

## 2025-01-03 DIAGNOSIS — R63.6 UNDERWEIGHT: ICD-10-CM

## 2025-01-03 LAB
ALBUMIN SERPL-MCNC: 4.5 G/DL (ref 3.4–5)
ALBUMIN/GLOB SERPL: 2.1 {RATIO} (ref 1.1–2.2)
ALP SERPL-CCNC: 73 U/L (ref 40–129)
ALT SERPL-CCNC: 38 U/L (ref 10–40)
ANION GAP SERPL CALCULATED.3IONS-SCNC: 10 MMOL/L (ref 3–16)
AST SERPL-CCNC: 32 U/L (ref 15–37)
BILIRUB SERPL-MCNC: 0.5 MG/DL (ref 0–1)
BUN SERPL-MCNC: 23 MG/DL (ref 7–20)
CALCIUM SERPL-MCNC: 9.4 MG/DL (ref 8.3–10.6)
CHLORIDE SERPL-SCNC: 103 MMOL/L (ref 99–110)
CO2 SERPL-SCNC: 28 MMOL/L (ref 21–32)
CREAT SERPL-MCNC: 1 MG/DL (ref 0.9–1.3)
EST. AVERAGE GLUCOSE BLD GHB EST-MCNC: 99.7 MG/DL
GFR SERPLBLD CREATININE-BSD FMLA CKD-EPI: 90 ML/MIN/{1.73_M2}
GLUCOSE SERPL-MCNC: 83 MG/DL (ref 70–99)
HBA1C MFR BLD: 5.1 %
POTASSIUM SERPL-SCNC: 4.1 MMOL/L (ref 3.5–5.1)
PROT SERPL-MCNC: 6.6 G/DL (ref 6.4–8.2)
SODIUM SERPL-SCNC: 141 MMOL/L (ref 136–145)
T3 SERPL-MCNC: 1.13 NG/ML (ref 0.8–2)
T4 FREE SERPL-MCNC: 1 NG/DL (ref 0.9–1.8)
TSH SERPL DL<=0.005 MIU/L-ACNC: 5.6 UIU/ML (ref 0.27–4.2)

## 2025-01-13 ENCOUNTER — OFFICE VISIT (OUTPATIENT)
Dept: ENDOCRINOLOGY | Age: 55
End: 2025-01-13
Payer: COMMERCIAL

## 2025-01-13 VITALS
SYSTOLIC BLOOD PRESSURE: 142 MMHG | DIASTOLIC BLOOD PRESSURE: 90 MMHG | BODY MASS INDEX: 17.74 KG/M2 | HEIGHT: 72 IN | HEART RATE: 57 BPM | WEIGHT: 131 LBS

## 2025-01-13 DIAGNOSIS — R63.6 UNDERWEIGHT: ICD-10-CM

## 2025-01-13 DIAGNOSIS — E06.3 HYPOTHYROIDISM DUE TO HASHIMOTO'S THYROIDITIS: Primary | ICD-10-CM

## 2025-01-13 DIAGNOSIS — E78.2 MIXED HYPERLIPIDEMIA: ICD-10-CM

## 2025-01-13 PROCEDURE — 1036F TOBACCO NON-USER: CPT | Performed by: INTERNAL MEDICINE

## 2025-01-13 PROCEDURE — G8419 CALC BMI OUT NRM PARAM NOF/U: HCPCS | Performed by: INTERNAL MEDICINE

## 2025-01-13 PROCEDURE — G8427 DOCREV CUR MEDS BY ELIG CLIN: HCPCS | Performed by: INTERNAL MEDICINE

## 2025-01-13 PROCEDURE — 3017F COLORECTAL CA SCREEN DOC REV: CPT | Performed by: INTERNAL MEDICINE

## 2025-01-13 PROCEDURE — 99214 OFFICE O/P EST MOD 30 MIN: CPT | Performed by: INTERNAL MEDICINE

## 2025-01-13 NOTE — PROGRESS NOTES
SUBJECTIVE:  Biju Ann is a 54 y.o. male who is seen  here for subclinical hypothyroidism and abnormalities of glucose metabolism with a low C-peptide of 1 in January 2023.  Repeat evaluation of C-peptide was in the normal range.  Pts mom is my patient who referred him to me, mom has thyroid cancer     Patient was diagnosed with Hypothyroidism approximately at age 47 , he was tested for thyroid hormone abnormality as his mom had Thyroid cancer .      Symptoms at presentation were Fatigue and weight loss and throat clearing   current complaints: fatigue  History of obstructive symptoms: difficulty swallowing No, changes in voice/hoarseness No.    History of radiation to patient's neck: NO  Recent iodine exposure: No  Family history includes hypothyroidism, thyroid cancer.  Family history of thyroid cancer: Yes  Pt has been diagnosed with ITP but platelet count has never been low   He has been diagnosed with MCTD and had seen a rheumatologist in the past but no medication was required.  He has hyperlipidemia and has been on statins since 2017   He has noted that his glucose levels have been creeping up on his fasting labs for the last few years . Most recent was 100 in June 2020   He denies any polyuria or polydipsia   He has been under weight all his life but in the last few months he has noticed aprox 5 lbs weight loss. Denies any skin discoloration.  Normal echocardiogram in 2015     INTERIM   Patient denies any changes in his symptoms denies any fatigue, constipation, abnormal weight gain, cold intolerance.   At home 120/80 BP  No chest pain or SOB and will continue to check blood pressure at home        Past Medical History:   Diagnosis Date    History of ITP     Hyperlipidemia      Patient Active Problem List    Diagnosis Date Noted    Family history of thyroid cancer 01/15/2024    Abnormal glucose level 01/15/2024    Hypothyroidism due to Hashimoto's thyroiditis 01/11/2021    Underweight 01/11/2021    Mixed

## 2025-06-09 ENCOUNTER — HOSPITAL ENCOUNTER (OUTPATIENT)
Age: 55
Discharge: HOME | End: 2025-06-09
Payer: COMMERCIAL

## 2025-06-09 DIAGNOSIS — M54.59 OTHER LOW BACK PAIN: ICD-10-CM

## 2025-06-09 PROCEDURE — 72100 X-RAY EXAM L-S SPINE 2/3 VWS: CPT

## 2025-06-16 ENCOUNTER — TRANSCRIBE ORDERS (OUTPATIENT)
Dept: ADMINISTRATIVE | Age: 55
End: 2025-06-16

## 2025-06-16 DIAGNOSIS — M51.362 OTHER INTERVERTEBRAL DISC DEGENERATION, LUMBAR REGION WITH DISCOGENIC BACK PAIN AND LOWER EXTREMITY PAIN: Primary | ICD-10-CM

## 2025-06-24 ENCOUNTER — HOSPITAL ENCOUNTER (OUTPATIENT)
Age: 55
Discharge: HOME | End: 2025-06-24
Payer: COMMERCIAL

## 2025-06-24 DIAGNOSIS — M51.362 OTHER INTERVERTEBRAL DISC DEGENERATION, LUMBAR REGION WITH DISCOGENIC BACK PAIN AND LOWER EXTREMITY PAIN: ICD-10-CM

## 2025-06-24 PROCEDURE — 72148 MRI LUMBAR SPINE W/O DYE: CPT
